# Patient Record
(demographics unavailable — no encounter records)

---

## 2025-04-03 NOTE — HISTORY OF PRESENT ILLNESS
[FreeTextEntry1] : Patient with history of BPH, microscopic hematuria, pelvic floor myalgia.  Patient also has a history of diverticulitis was found to have 0.8 cm nodule along the right trigone.  Remains with microscopic hematuria.  Upper tract imaging shows no abnormalities and prior cystoscopy last year was unremarkable with open prostatic urethra and without mucosal abnormalities.  Patient studies were done through [facility] and will upload those films to our system today.  Urine will be sent off for analysis and cytology and the patient will return for cystoscopy ASAP.  Recent lab work from PCP placed into chart.  PSA from December 2024 0.93 ng/cc, creatinine 0.82 mg/dL.

## 2025-05-06 NOTE — HISTORY OF PRESENT ILLNESS
[FreeTextEntry1] : See cystoscopy worksheet.  No evidence of bladder mass with direct inspection or with retroflexion of the flexible cystoscope.  All urine studies negative.  Patient also was complaining of some difficulties with urinary hesitancy and poor flow and asked to start a course of tamsulosin today.  Aware of associated risks and benefits and will follow-up in the office in 6 to 8 weeks.